# Patient Record
Sex: MALE | Race: WHITE | NOT HISPANIC OR LATINO | Employment: FULL TIME | URBAN - METROPOLITAN AREA
[De-identification: names, ages, dates, MRNs, and addresses within clinical notes are randomized per-mention and may not be internally consistent; named-entity substitution may affect disease eponyms.]

---

## 2023-08-09 ENCOUNTER — OFFICE VISIT (OUTPATIENT)
Dept: PRIMARY CARE | Facility: CLINIC | Age: 63
End: 2023-08-09
Payer: COMMERCIAL

## 2023-08-09 VITALS — DIASTOLIC BLOOD PRESSURE: 79 MMHG | SYSTOLIC BLOOD PRESSURE: 129 MMHG | HEART RATE: 76 BPM | RESPIRATION RATE: 14 BRPM

## 2023-08-09 DIAGNOSIS — M54.2 NECK PAIN, ACUTE: Primary | ICD-10-CM

## 2023-08-09 PROBLEM — I25.10 ARTERIOSCLEROSIS OF CORONARY ARTERY: Status: ACTIVE | Noted: 2023-08-09

## 2023-08-09 PROCEDURE — 99203 OFFICE O/P NEW LOW 30 MIN: CPT | Performed by: FAMILY MEDICINE

## 2023-08-09 RX ORDER — ATORVASTATIN CALCIUM 80 MG/1
1 TABLET, FILM COATED ORAL NIGHTLY
COMMUNITY
Start: 2013-09-01

## 2023-08-09 RX ORDER — CYCLOBENZAPRINE HCL 10 MG
10 TABLET ORAL 3 TIMES DAILY PRN
Qty: 30 TABLET | Refills: 0 | Status: SHIPPED | OUTPATIENT
Start: 2023-08-09

## 2023-08-09 RX ORDER — CARVEDILOL 12.5 MG/1
12.5 TABLET ORAL
COMMUNITY

## 2023-08-09 RX ORDER — LISINOPRIL 20 MG/1
TABLET ORAL
COMMUNITY
Start: 2014-10-17

## 2023-08-09 NOTE — PROGRESS NOTES
Subjective   Patient ID: Cortez Short is a 63 y.o. male who presents for ext pcp    HPI   Neck pain for 3 weeks. Neck pain was worse at 5/10 at night. No ext numbness or weakness.  Review of Systems    Objective   /79   Pulse 76   Resp 14     Physical Exam  No distress, well groomed,  normal respiration, lungs: CTA b/l, heart: RRR, no LE  edema,  abd: soft, no tenderness, BS+, mild posterior neck tenderness, normal strength and sensation at extremities, good balance,     Assessment/Plan     Acute neck pain. Flexeril as dir. Stretch exercise. Call office if symptoms do not resolve in 7days

## 2023-09-05 ENCOUNTER — TELEPHONE (OUTPATIENT)
Dept: PRIMARY CARE | Facility: CLINIC | Age: 63
End: 2023-09-05
Payer: COMMERCIAL

## 2023-09-05 NOTE — TELEPHONE ENCOUNTER
Patient needs two refills [facility] [address] in [place] 90 days supply. Patient is going out of town today     Carvedilol 12.5 mg 2x daily   Lisinopril 10 mg 2x daily

## 2023-10-16 ENCOUNTER — APPOINTMENT (OUTPATIENT)
Dept: PRIMARY CARE | Facility: CLINIC | Age: 63
End: 2023-10-16
Payer: COMMERCIAL

## 2024-11-05 RX ORDER — LISINOPRIL 10 MG/1
10 TABLET ORAL 2 TIMES DAILY
Qty: 180 TABLET | Refills: 3 | OUTPATIENT
Start: 2024-11-05

## 2024-11-06 RX ORDER — CARVEDILOL 12.5 MG/1
12.5 TABLET ORAL 2 TIMES DAILY
Qty: 180 TABLET | Refills: 3 | OUTPATIENT
Start: 2024-11-06

## 2024-12-27 ENCOUNTER — LAB (OUTPATIENT)
Dept: LAB | Facility: LAB | Age: 64
End: 2024-12-27

## 2024-12-27 DIAGNOSIS — Z12.5 ENCOUNTER FOR SCREENING FOR MALIGNANT NEOPLASM OF PROSTATE: ICD-10-CM

## 2024-12-27 DIAGNOSIS — E78.5 HYPERLIPIDEMIA, UNSPECIFIED: ICD-10-CM

## 2024-12-27 DIAGNOSIS — I25.10 ATHEROSCLEROTIC HEART DISEASE OF NATIVE CORONARY ARTERY WITHOUT ANGINA PECTORIS: Primary | ICD-10-CM

## 2024-12-27 LAB
ALBUMIN SERPL BCP-MCNC: 4.3 G/DL (ref 3.4–5)
ALP SERPL-CCNC: 46 U/L (ref 33–136)
ALT SERPL W P-5'-P-CCNC: 20 U/L (ref 10–52)
ANION GAP SERPL CALC-SCNC: 9 MMOL/L (ref 10–20)
AST SERPL W P-5'-P-CCNC: 19 U/L (ref 9–39)
BILIRUB SERPL-MCNC: 0.5 MG/DL (ref 0–1.2)
BUN SERPL-MCNC: 12 MG/DL (ref 6–23)
CALCIUM SERPL-MCNC: 8.8 MG/DL (ref 8.6–10.3)
CHLORIDE SERPL-SCNC: 105 MMOL/L (ref 98–107)
CHOLEST SERPL-MCNC: 204 MG/DL (ref 0–199)
CHOLESTEROL/HDL RATIO: 4.2
CO2 SERPL-SCNC: 29 MMOL/L (ref 21–32)
CREAT SERPL-MCNC: 0.99 MG/DL (ref 0.5–1.3)
EGFRCR SERPLBLD CKD-EPI 2021: 86 ML/MIN/1.73M*2
GLUCOSE SERPL-MCNC: 93 MG/DL (ref 74–99)
HDLC SERPL-MCNC: 48.6 MG/DL
LDLC SERPL CALC-MCNC: 123 MG/DL
NON HDL CHOLESTEROL: 155 MG/DL (ref 0–149)
POTASSIUM SERPL-SCNC: 4.2 MMOL/L (ref 3.5–5.3)
PROT SERPL-MCNC: 6.4 G/DL (ref 6.4–8.2)
PSA SERPL-MCNC: 1.18 NG/ML
SODIUM SERPL-SCNC: 139 MMOL/L (ref 136–145)
TRIGL SERPL-MCNC: 161 MG/DL (ref 0–149)
VLDL: 32 MG/DL (ref 0–40)

## 2024-12-27 PROCEDURE — 80053 COMPREHEN METABOLIC PANEL: CPT

## 2024-12-27 PROCEDURE — 84153 ASSAY OF PSA TOTAL: CPT

## 2024-12-27 PROCEDURE — 80061 LIPID PANEL: CPT

## 2025-04-03 ENCOUNTER — OFFICE VISIT (OUTPATIENT)
Dept: URGENT CARE | Age: 65
End: 2025-04-03
Payer: COMMERCIAL

## 2025-04-03 ENCOUNTER — ANCILLARY PROCEDURE (OUTPATIENT)
Dept: URGENT CARE | Age: 65
End: 2025-04-03
Payer: COMMERCIAL

## 2025-04-03 VITALS
TEMPERATURE: 98.5 F | OXYGEN SATURATION: 95 % | RESPIRATION RATE: 17 BRPM | SYSTOLIC BLOOD PRESSURE: 162 MMHG | HEART RATE: 59 BPM | DIASTOLIC BLOOD PRESSURE: 88 MMHG

## 2025-04-03 DIAGNOSIS — R07.89 CHEST TIGHTNESS: Primary | ICD-10-CM

## 2025-04-03 DIAGNOSIS — R06.09 OTHER FORM OF DYSPNEA: ICD-10-CM

## 2025-04-03 DIAGNOSIS — Z95.5 PRESENCE OF CORONARY ANGIOPLASTY IMPLANT AND GRAFT: ICD-10-CM

## 2025-04-03 DIAGNOSIS — R07.89 CHEST TIGHTNESS: ICD-10-CM

## 2025-04-03 DIAGNOSIS — I25.9 CHRONIC ISCHEMIC HEART DISEASE: ICD-10-CM

## 2025-04-03 PROCEDURE — 99205 OFFICE O/P NEW HI 60 MIN: CPT | Performed by: NURSE PRACTITIONER

## 2025-04-03 PROCEDURE — 71046 X-RAY EXAM CHEST 2 VIEWS: CPT | Performed by: NURSE PRACTITIONER

## 2025-04-03 PROCEDURE — 93000 ELECTROCARDIOGRAM COMPLETE: CPT | Performed by: NURSE PRACTITIONER

## 2025-04-03 NOTE — PATIENT INSTRUCTIONS
Sending to ER for additional eval of Dyspnea, chest tightness, hypertension R/O PE, CHF, ACS, other cardiovascular event  CXR negative for pneumonia  EKG stable-compared with previous 7/20  Transferring to ER-declined squad, alert and oriented, able to make decisions, verbalized understanding of increased cardiovascular risk factors and death, signed AMA form, agreed to pull over and call 911 if worsening   Dr Pacheco notified

## 2025-04-03 NOTE — PROGRESS NOTES
Subjective   Patient ID: Cortez Short is a 65 y.o. male. They present today with a chief complaint of Chest Pain (Presents with complaint of Waking up feeling like he couldn't take a deep breath with some tightness in chest, coughed up some white phlegm, wants to make sure he doesn't have pneumonia.  History of STEMI with stents x 10 years. ). Denies difficulty sleeping lying down.  Took coreg and lisinopril this morning per routine.        Past Medical History  Allergies as of 04/03/2025    (No Known Allergies)       (Not in a hospital admission)       Past Medical History:   Diagnosis Date    Personal history of other diseases of the circulatory system     History of hypertension    ST elevation (STEMI) myocardial infarction of unspecified site (Multi)     ST elevation (STEMI) myocardial infarction       No past surgical history on file.                                      Objective    Vitals:    04/03/25 1112 04/03/25 1113 04/03/25 1115   BP: 177/84 (!) 184/83 162/88   Pulse: 59     Resp: 17     Temp: 36.9 °C (98.5 °F)     TempSrc: Oral     SpO2: 95%       No LMP for male patient.    Physical Exam  Constitutional:       Appearance: Normal appearance.   HENT:      Right Ear: Tympanic membrane, ear canal and external ear normal.      Left Ear: Tympanic membrane, ear canal and external ear normal.      Nose: Nose normal.   Eyes:      Extraocular Movements: Extraocular movements intact.      Pupils: Pupils are equal, round, and reactive to light.   Cardiovascular:      Rate and Rhythm: Normal rate and regular rhythm.      Pulses: Normal pulses.      Heart sounds: Normal heart sounds.      Comments: +1 JVD, BLE puffy  Pulmonary:      Breath sounds: Examination of the right-lower field reveals rales. Examination of the left-lower field reveals rales. Rales present.   Musculoskeletal:      Cervical back: Normal range of motion and neck supple.      Left lower leg: Edema present.   Neurological:      Mental Status: He  is alert.         Procedures    Point of Care Test & Imaging Results from this visit  No results found for this visit on 04/03/25.   Imaging  XR chest 2 views    Result Date: 4/3/2025  1.  No evidence of acute cardiopulmonary process.       MACRO: None   Signed by: Vangie Jose 4/3/2025 11:58 AM Dictation workstation:   [id number]     Cardiology, Vascular, and Other Imaging  ECG 12 lead (Clinic Performed)    Result Date: 4/3/2025  SR, horizontal axis, RBBB, inferior repolarization disturbance secondary to RBBB       Diagnostic study results (if any) were reviewed by ASMITA Roman.    Assessment/Plan   Allergies, medications, history, and pertinent labs/EKGs/Imaging reviewed by ASMITA Roman.     Medical Decision Making  Sending to ER for additional eval of Dyspnea, chest tightness, hypertension R/O PE, CHF, ACS, other cardiovascular event  CXR negative for pneumonia  EKG stable-compared with previous 7/20  Transferring to ER-declined squad, alert and oriented, able to make decisions, verbalized understanding of increased cardiovascular risk factors and death, signed AMA form, agreed to pull over and call 911 if worsening   Dr Pacheco notified    Orders and Diagnoses  Diagnoses and all orders for this visit:  Chest tightness  -     ECG 12 lead (Clinic Performed)  -     XR chest 2 views; Future  Other form of dyspnea  Chronic ischemic heart disease  Presence of coronary angioplasty implant and graft      Medical Admin Record      Patient disposition: ED    Electronically signed by ASMITA Roman  3:02 PM

## 2025-04-05 ENCOUNTER — APPOINTMENT (OUTPATIENT)
Dept: RADIOLOGY | Facility: HOSPITAL | Age: 65
End: 2025-04-05
Payer: COMMERCIAL

## 2025-04-05 ENCOUNTER — HOSPITAL ENCOUNTER (EMERGENCY)
Facility: HOSPITAL | Age: 65
Discharge: HOME | End: 2025-04-05
Attending: STUDENT IN AN ORGANIZED HEALTH CARE EDUCATION/TRAINING PROGRAM
Payer: COMMERCIAL

## 2025-04-05 ENCOUNTER — APPOINTMENT (OUTPATIENT)
Dept: CARDIOLOGY | Facility: HOSPITAL | Age: 65
End: 2025-04-05
Payer: COMMERCIAL

## 2025-04-05 VITALS
RESPIRATION RATE: 18 BRPM | WEIGHT: 194 LBS | BODY MASS INDEX: 27.77 KG/M2 | DIASTOLIC BLOOD PRESSURE: 87 MMHG | SYSTOLIC BLOOD PRESSURE: 142 MMHG | HEART RATE: 60 BPM | TEMPERATURE: 97.9 F | OXYGEN SATURATION: 99 % | HEIGHT: 70 IN

## 2025-04-05 DIAGNOSIS — I10 HYPERTENSION, UNSPECIFIED TYPE: Primary | ICD-10-CM

## 2025-04-05 DIAGNOSIS — F51.01 INSOMNIA, IDIOPATHIC: ICD-10-CM

## 2025-04-05 LAB
ALBUMIN SERPL BCP-MCNC: 4.4 G/DL (ref 3.4–5)
ANION GAP SERPL CALC-SCNC: 11 MMOL/L (ref 10–20)
BASOPHILS # BLD AUTO: 0.03 X10*3/UL (ref 0–0.1)
BASOPHILS NFR BLD AUTO: 0.4 %
BUN SERPL-MCNC: 16 MG/DL (ref 6–23)
CALCIUM SERPL-MCNC: 8.9 MG/DL (ref 8.6–10.3)
CARDIAC TROPONIN I PNL SERPL HS: 10 NG/L (ref 0–20)
CHLORIDE SERPL-SCNC: 102 MMOL/L (ref 98–107)
CO2 SERPL-SCNC: 27 MMOL/L (ref 21–32)
CREAT SERPL-MCNC: 0.93 MG/DL (ref 0.5–1.3)
EGFRCR SERPLBLD CKD-EPI 2021: >90 ML/MIN/1.73M*2
EOSINOPHIL # BLD AUTO: 0.07 X10*3/UL (ref 0–0.7)
EOSINOPHIL NFR BLD AUTO: 0.9 %
ERYTHROCYTE [DISTWIDTH] IN BLOOD BY AUTOMATED COUNT: 12.2 % (ref 11.5–14.5)
FLUAV RNA RESP QL NAA+PROBE: NOT DETECTED
FLUBV RNA RESP QL NAA+PROBE: NOT DETECTED
GLUCOSE SERPL-MCNC: 79 MG/DL (ref 74–99)
HCT VFR BLD AUTO: 42.6 % (ref 41–52)
HGB BLD-MCNC: 15.3 G/DL (ref 13.5–17.5)
IMM GRANULOCYTES # BLD AUTO: 0.02 X10*3/UL (ref 0–0.7)
IMM GRANULOCYTES NFR BLD AUTO: 0.3 % (ref 0–0.9)
LYMPHOCYTES # BLD AUTO: 1.16 X10*3/UL (ref 1.2–4.8)
LYMPHOCYTES NFR BLD AUTO: 15.7 %
MCH RBC QN AUTO: 31.9 PG (ref 26–34)
MCHC RBC AUTO-ENTMCNC: 35.9 G/DL (ref 32–36)
MCV RBC AUTO: 89 FL (ref 80–100)
MONOCYTES # BLD AUTO: 0.48 X10*3/UL (ref 0.1–1)
MONOCYTES NFR BLD AUTO: 6.5 %
NEUTROPHILS # BLD AUTO: 5.61 X10*3/UL (ref 1.2–7.7)
NEUTROPHILS NFR BLD AUTO: 76.2 %
NRBC BLD-RTO: 0 /100 WBCS (ref 0–0)
PHOSPHATE SERPL-MCNC: 3 MG/DL (ref 2.5–4.9)
PLATELET # BLD AUTO: 209 X10*3/UL (ref 150–450)
POTASSIUM SERPL-SCNC: 4 MMOL/L (ref 3.5–5.3)
RBC # BLD AUTO: 4.79 X10*6/UL (ref 4.5–5.9)
RSV RNA RESP QL NAA+PROBE: NOT DETECTED
SARS-COV-2 RNA RESP QL NAA+PROBE: NOT DETECTED
SODIUM SERPL-SCNC: 136 MMOL/L (ref 136–145)
WBC # BLD AUTO: 7.4 X10*3/UL (ref 4.4–11.3)

## 2025-04-05 PROCEDURE — 93005 ELECTROCARDIOGRAM TRACING: CPT

## 2025-04-05 PROCEDURE — 70450 CT HEAD/BRAIN W/O DYE: CPT

## 2025-04-05 PROCEDURE — 84484 ASSAY OF TROPONIN QUANT: CPT | Performed by: STUDENT IN AN ORGANIZED HEALTH CARE EDUCATION/TRAINING PROGRAM

## 2025-04-05 PROCEDURE — 36415 COLL VENOUS BLD VENIPUNCTURE: CPT

## 2025-04-05 PROCEDURE — 99285 EMERGENCY DEPT VISIT HI MDM: CPT | Mod: 25 | Performed by: STUDENT IN AN ORGANIZED HEALTH CARE EDUCATION/TRAINING PROGRAM

## 2025-04-05 PROCEDURE — 87637 SARSCOV2&INF A&B&RSV AMP PRB: CPT

## 2025-04-05 PROCEDURE — 70450 CT HEAD/BRAIN W/O DYE: CPT | Performed by: RADIOLOGY

## 2025-04-05 PROCEDURE — 85025 COMPLETE CBC W/AUTO DIFF WBC: CPT

## 2025-04-05 PROCEDURE — 80069 RENAL FUNCTION PANEL: CPT

## 2025-04-05 RX ORDER — ACETAMINOPHEN 325 MG/1
975 TABLET ORAL ONCE
Status: DISCONTINUED | OUTPATIENT
Start: 2025-04-05 | End: 2025-04-05 | Stop reason: HOSPADM

## 2025-04-05 RX ORDER — TRAZODONE HYDROCHLORIDE 50 MG/1
50 TABLET ORAL NIGHTLY
Qty: 14 TABLET | Refills: 0 | Status: SHIPPED | OUTPATIENT
Start: 2025-04-05 | End: 2025-04-19

## 2025-04-05 ASSESSMENT — LIFESTYLE VARIABLES
HAVE PEOPLE ANNOYED YOU BY CRITICIZING YOUR DRINKING: NO
EVER FELT BAD OR GUILTY ABOUT YOUR DRINKING: NO
HAVE YOU EVER FELT YOU SHOULD CUT DOWN ON YOUR DRINKING: NO
EVER HAD A DRINK FIRST THING IN THE MORNING TO STEADY YOUR NERVES TO GET RID OF A HANGOVER: NO
TOTAL SCORE: 0

## 2025-04-05 ASSESSMENT — PAIN DESCRIPTION - PAIN TYPE: TYPE: ACUTE PAIN

## 2025-04-05 ASSESSMENT — PAIN DESCRIPTION - DESCRIPTORS: DESCRIPTORS: ACHING

## 2025-04-05 ASSESSMENT — PAIN DESCRIPTION - LOCATION: LOCATION: HEAD

## 2025-04-05 ASSESSMENT — PAIN - FUNCTIONAL ASSESSMENT: PAIN_FUNCTIONAL_ASSESSMENT: 0-10

## 2025-04-05 ASSESSMENT — PAIN SCALES - GENERAL: PAINLEVEL_OUTOF10: 1

## 2025-04-05 NOTE — ED PROVIDER NOTES
CC: Flu Symptoms and Hypertension     History provided by: Patient  Limitations to History: None    HPI:  Patient is a 69-year-old male who presents with headache and high blood pressure.  He states he has a history of hypertension and takes carvedilol and lisinopril as prescribed.  He also takes a baby aspirin, has a history of prior CABG a few years ago.  He states he noted high blood pressure 170s to 180s systolic at home over the last few days but is taking all of his home medications.  He then states he developed a headache that is a pressure-like sensation to the top of his head approximately 1 to 2 days ago.  He has not taken anything for pain.  He also notes some photosensitivity but denies any neck pain, numbness, tingling, weakness, speech changes, facial droop, syncopal events.  He denies any chest pain, shortness of breath at this time.  States he is also battling a cold, has had 1 to 2 weeks of cough, congestion which recently improved approximately 4 days ago.  Was seen at urgent care for these complaints and had a negative chest x-ray for pneumonia and a reassuring EKG per wife and patient.  He denies any recent fevers, increasing cough since that x-ray.    External Records Reviewed:  I reviewed prior ED visits, Care Everywhere, discharge summaries and outpatient records as appropriate.   ???????????????????????????????????????????????????????????????  Triage Vitals:  T 36.7 °C (98.1 °F)  HR 62  BP (!) 182/100  RR 18  O2 99 % None (Room air)    Physical Exam  Vitals and nursing note reviewed.   Constitutional:       General: He is not in acute distress.     Appearance: Normal appearance.   HENT:      Head: Normocephalic and atraumatic.   Eyes:      Conjunctiva/sclera: Conjunctivae normal.   Cardiovascular:      Rate and Rhythm: Normal rate and regular rhythm.   Pulmonary:      Effort: Pulmonary effort is normal. No respiratory distress.      Breath sounds: Normal breath sounds.      Comments: No  chest wall tenderness to palpation  Abdominal:      General: Abdomen is flat. There is no distension.      Palpations: Abdomen is soft.      Tenderness: There is no abdominal tenderness. There is no guarding or rebound.   Musculoskeletal:         General: Normal range of motion.      Cervical back: Normal range of motion and neck supple.   Skin:     General: Skin is warm and dry.   Neurological:      General: No focal deficit present.      Mental Status: He is alert and oriented to person, place, and time. Mental status is at baseline.      Comments: Normal speech, no pronator drift, 5 infections in bilateral upper and lower extremities, no facial droop, no appreciable dysmetria   Psychiatric:         Mood and Affect: Mood normal.         Behavior: Behavior normal.        ???????????????????????????????????????????????????????????????  ED Course/Treatment/Medical Decision Making  MDM:  Patient is a 65-year-old male who presents with hypertension and headache.  Vital signs notable for hypertension otherwise he is hemodynamically stable.  He has no focal neurologic deficits on examination.  He does not appear meningitic or encephalopathic.  Differential diagnoses considered include hypertensive urgency, sinusitis, tension headache, migraine, CVA, TIA.  I did obtain CT head given persistent hypertension as well as development of headache however he has no deficits on exam.  I did offer analgesics with patient declined at this time.  He denies any chest pain however did obtain EKG.  He states he does have a cardiologist that he can follow-up with as well as a primary care physician for possible titration of outpatient medications including antihypertensives.  Viral swabs and basic labs obtained as well.      ED Course:  ED Course as of 04/05/25 1628   Sat Apr 05, 2025   1342 CXR from 4/3/25 reviewed:  IMPRESSION:  1.  No evidence of acute cardiopulmonary process.   [SA]   1420 EKG reviewed normal sinus rhythm rate 59,  FL interval 122 ms,  ms, QTc 449 ms, right bundle branch block present, no acute ST segment elevations or depressions, overall appears similar when compared to prior from July 2020 [SA]   1423 CBC wnl and CMP wnl, viral swabs negative and troponin overall wnl [SA]   1518 CT head wo IV contrast  IMPRESSION:  No evidence of acute cortical infarct or intracranial hemorrhage.      No evidence of intracranial hemorrhage or displaced skull fracture.   [SA]   1546 IMPRESSION:  No evidence of acute cortical infarct or intracranial hemorrhage.      No evidence of intracranial hemorrhage or displaced skull fracture.   [HD]   1546 140/89 on my assessment [HD]   1608 65-year-old presents to the emergency department with hypertension.  He has been noticing that his face has been getting red with his high blood pressure.  He is on 2 antihypertensives and gets his care at [facility].  He denies chest pain or shortness of breath.  States he has a mild headache but recently has been sick and taking over-the-counter cough and cold medicine which is likely contributing to his hypertension.  Labs obtained for endorgan ischemia and reassuring.  CT head negative.  Discussed results with patient at bedside.  He also reports taking a significant amount of over-the-counter sleep aids.  Will trial a course of trazodone to help with idiopathic insomnia.  Patient follows closely with his primary care and was encouraged to follow-up within 1 week for repeat blood pressure check and encouraged to bring his cuff at that time.  Also discussed safe ways of alcohol sensation with patient for greater than 5 minutes.  Patient expressed understanding.  Patient given strict return precautions for severe headache, weakness on one side of his body, chest pain or shortness of breath. [HD]      ED Course User Index  [HD] Lavinia Crook DO  [SA] Vivian Grewal DO         Diagnoses as of 04/05/25 1628   Hypertension, unspecified type   Insomnia,  idiopathic         EKG Interpretation:  See ED Course/Below:    Independent Interpretation of Studies:  I independently interpreted labs/imaging as stated in ED Course or below.    Differential diagnoses considered include but are not limited to: See MDM/Below:    Social Determinants Limiting Care:  None identified The following actions were taken to address these social determinants:      Discussion of Management with Other Providers: See MDM/Below:    Disposition:  Discussed differential and workup results including pertinent labs/imaging and any incidental findings if applicable. Patient will follow-up with the primary physician in the next 2-3 days. Return if worse. They understand return precautions and discharge instructions. They were given the opportunity to ask any questions. All of their questions were answered. Patient and family/friend/caregiver are in agreement with this plan.       ERMELINDA Caldera, PGY-3    I reviewed the case with the attending ED physician. The attending ED physician agrees with the plan. Patient and/or patient´s representative was counseled regarding labs, imaging, likely diagnosis, and plan. All questions were answered.    Disclaimer: This note was dictated by speech recognition.  Attempt at proofreading was made to minimize errors.  Errors in transcription may be present.  Please call if questions.    Procedures ? [employer] last updated 4/5/2025 4:28 PM        Vivian Grewal DO  Resident  04/05/25 1475

## 2025-04-05 NOTE — ED TRIAGE NOTES
"Patient presents with mild headache, feeling \"run-down\", and HTN for the past few days. Seen at urgent care, CXR and EKG clear. Takes lisinopril and carvedilol, reports compliance. Denies SOB/CP/n/v/fever/vision changes.  "

## 2025-04-05 NOTE — DISCHARGE INSTRUCTIONS
Follow-up with your primary care doctor as needed.  If you do not have a primary care doctor you may call [phone] to make an appointment. Return to the emergency department if you have significant worsening of symptoms or for any other acute concerns.     You need BP recheck in 1 week

## 2025-04-07 LAB
ATRIAL RATE: 59 BPM
P AXIS: 23 DEGREES
P OFFSET: 172 MS
P ONSET: 132 MS
PR INTERVAL: 124 MS
Q ONSET: 194 MS
QRS COUNT: 10 BEATS
QRS DURATION: 150 MS
QT INTERVAL: 454 MS
QTC CALCULATION(BAZETT): 449 MS
QTC FREDERICIA: 451 MS
R AXIS: -5 DEGREES
T AXIS: 23 DEGREES
T OFFSET: 421 MS
VENTRICULAR RATE: 59 BPM